# Patient Record
(demographics unavailable — no encounter records)

---

## 2025-01-14 NOTE — HISTORY OF PRESENT ILLNESS
[FreeTextEntry1] :  Subjective:   - Chief Complaint (CC): Patient presents for routine follow-up of hypertension and medication management. No acute complaints reported.   - History of Present Illness: The patient is an 83-year-old male with a history of hypertension, currently well-controlled on medication. He reports no chest pain, shortness of breath, or other cardiovascular symptoms upon exertion. The patient mentions occasional leg paresthesias for which he takes Lyrica. He has been taking his blood pressure medications as prescribed and reports his home blood pressure readings are typically around 120-130/80 mmHg. The patient expresses interest in potentially reducing his medications if possible, but understands the importance of maintaining good blood pressure control.   - Past Medical History: 1. Hypertension - well-controlled on current medications 2. History of colon polyps - status post surgical removal in  and  3. History of chemotherapy treatment for one year following initial polyp removal 4. Occasional leg cramps   - Past Surgical History: 1. Colon polyp removal surgery in  at a hospital in [place] 2. Second colon surgery in  at [facility],    - Family History:     - Mother had a history of hypertension      - Older sister (age 89) is still living     - Middle sister (age 72) had some heart-related issues     - Eldest sister  during or shortly after heart surgery in [place]     - No family history of premature CAD   - Social History:     -Non-smoker     - Frequently travels     - Retired   - Review of Systems:     - General: No reported weight changes, fatigue, or fever     - Neurological: No headaches, dizziness, or syncope reported     - Musculoskeletal: Reports occasional leg cramps     - Cardiovascular: Denies chest pain, palpitations, or dyspnea on exertion. Notes some decrease in exercise tolerance compared to when younger, but attributes this to age     - Respiratory: No cough, wheezing, or shortness of breath reported     - Gastrointestinal: No abdominal pain, nausea, vomiting, or changes in bowel habits reported     - Genitourinary: No urinary symptoms reported     - Psychiatric: No mood changes or sleep disturbances reported   - Medications:     - Amlodipine 5 mg daily     - Olmesartan/Hydrochlorothiazide 40/12.5 mg daily     - Lyrica (dosage not specified) for leg cramps     - Magnesium supplement (dosage not specified)     - Lovastatin for cholesterol (specific medication and dosage not specified)   - Allergies: No known drug allergies reported   Objective:   - Vital Signs: Blood pressure typically 130/80 mmHg at home, pulse 51-52 bpm   - Physical Examination (PE):     - General: No acute distress     - HEENT: EOMI     - Neck: Supple, No JVD, no bruits.     - Lungs: Clear to auscultation bilaterally; No rales or wheezing     - Heart: Regular rate and rhythm; II/VI HSM at SB     - Abdomen: Nontender, bowel sounds present     - Extremities: No clubbing, cyanosis, or edema     - Nervous system: Alert & Oriented X3, no focal deficits     - Psychiatric: Normal affect     - Skin: No rashes or lesions   Assessment and Plan:   - Hypertension: The patient's hypertension appears well-controlled on current medication regimen. Home blood pressure readings are within target range for his age. Given his age and stable condition, we will maintain current medication doses.     - Continue current antihypertensive medications: Amlodipine 5 mg daily and Olmesartan/Hydrochlorothiazide 40/12.5 mg daily      - Encourage continued home blood pressure monitoring      - Scheduled echocardiogram in March to assess for any cardiac changes related to long-term hypertension (LVH) as well as due to murmur appreciated on exam, iRBBB noted on EKG. will perform echo in March when [place] is [place] the country      - Discussed importance of maintaining blood pressure control, even in older age, due to reduced risk of stroke and heart attack      - Patient to continue current lifestyle modifications including regular exercise as tolerated       - Hyperlipidemia: Patient reports taking a statin medication for cholesterol management.     - Continue current statin therapy (lovastatin)      - Current lipid panel is borderline. If worsens, would switch to atorvastatin or rosuvastatin   Disclaimer:   - This note was generated using [employer] software. A reasonable effort has been made for proofreading its contents, but typos may still remain. If there are any questions or points of clarification needed, please notify the office